# Patient Record
Sex: MALE | Race: WHITE | Employment: STUDENT | URBAN - METROPOLITAN AREA
[De-identification: names, ages, dates, MRNs, and addresses within clinical notes are randomized per-mention and may not be internally consistent; named-entity substitution may affect disease eponyms.]

---
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
## 2022-12-11 ENCOUNTER — HOSPITAL ENCOUNTER (EMERGENCY)
Age: 22
Discharge: HOME | End: 2022-12-11
Attending: EMERGENCY MEDICINE
Payer: MEDICAID

## 2022-12-11 VITALS
TEMPERATURE: 98 F | DIASTOLIC BLOOD PRESSURE: 74 MMHG | RESPIRATION RATE: 18 BRPM | HEART RATE: 71 BPM | WEIGHT: 190 LBS | SYSTOLIC BLOOD PRESSURE: 111 MMHG | OXYGEN SATURATION: 99 %

## 2022-12-11 DIAGNOSIS — S61.411A LACERATION OF RIGHT HAND WITHOUT FOREIGN BODY, INITIAL ENCOUNTER: Primary | ICD-10-CM

## 2022-12-11 PROCEDURE — 99283 EMERGENCY DEPT VISIT LOW MDM: CPT | Performed by: NURSE PRACTITIONER

## 2022-12-11 PROCEDURE — 12001 RPR S/N/AX/GEN/TRNK 2.5CM/<: CPT | Performed by: NURSE PRACTITIONER

## 2022-12-11 PROCEDURE — 99282 EMERGENCY DEPT VISIT SF MDM: CPT | Performed by: NURSE PRACTITIONER

## 2022-12-19 ENCOUNTER — HOSPITAL ENCOUNTER (EMERGENCY)
Age: 22
Discharge: HOME | End: 2022-12-19
Payer: MEDICAID

## 2022-12-19 VITALS
RESPIRATION RATE: 18 BRPM | OXYGEN SATURATION: 98 % | WEIGHT: 190 LBS | HEART RATE: 75 BPM | TEMPERATURE: 98 F | HEIGHT: 71 IN | DIASTOLIC BLOOD PRESSURE: 70 MMHG | BODY MASS INDEX: 26.6 KG/M2 | SYSTOLIC BLOOD PRESSURE: 120 MMHG

## 2022-12-19 DIAGNOSIS — S61.411D LACERATION OF RIGHT HAND, FOREIGN BODY PRESENCE UNSPECIFIED, SUBSEQUENT ENCOUNTER: ICD-10-CM

## 2022-12-19 DIAGNOSIS — Z48.02 ENCOUNTER FOR REMOVAL OF SUTURES: Primary | ICD-10-CM

## (undated) NOTE — LETTER
Date & Time: 10/9/2018, 10:27 PM  Patient: Phyllis Lomas  Encounter Provider(s): Darrick Nicholas MD       To Whom It May Concern:    Phyllis Lomas was seen and treated in our department on 10/9/2018.  He should not participate in gym/sports until 10

## (undated) NOTE — ED AVS SNAPSHOT
Janeen Booker   MRN: [id number]    Department:  [facility] in [place]   Date of Visit:  10/3/2019           Disclosure     Insurance plans vary and the physician(s) referred by the ER may not be covered by your plan.  Please conta tell this physician (or your personal doctor if your instructions are to return to your personal doctor) about any new or lasting problems. The primary care or specialist physician will see patients referred from the [facility] Emergency Department.  Travis Hastings

## (undated) NOTE — LETTER
Date & Time: 10/3/2019, 1:50 PM  Patient: Monica Ybarra  Encounter Provider(s):    Anne Beltre       To Whom It May Concern:    Monica Ybarra was seen and treated in our department on 10/3/2019.  Sunshine Jarrell is cleared to return to all sports a

## (undated) NOTE — ED AVS SNAPSHOT
Adrian Hayes   MRN: [id number]    Department:  THE [facility] Emergency Department in [place]   Date of Visit:  10/9/2018           Disclosure     Insurance plans vary and the physician(s) referred by the ER may not be covered by your plan.  Please conta tell this physician (or your personal doctor if your instructions are to return to your personal doctor) about any new or lasting problems. The primary care or specialist physician will see patients referred from the [facility] Emergency Department.  Shai Pierre

## (undated) NOTE — ED AVS SNAPSHOT
Mardealan Russell   MRN: [id number]    Department:  THE [facility] Emergency Department in [place]   Date of Visit:  9/22/2019           Disclosure     Insurance plans vary and the physician(s) referred by the ER may not be covered by your plan.  Please conta tell this physician (or your personal doctor if your instructions are to return to your personal doctor) about any new or lasting problems. The primary care or specialist physician will see patients referred from the [facility] Emergency Department.  Armani Arellano

## (undated) NOTE — LETTER
Date & Time: 10/3/2019, 1:44 PM  Patient: Channing Orn      To Whom It May Concern:    Channing Orn was seen and treated in our department on 10/3/2019. He may return to gym/sports today.     If you have any questions or concerns, please do not he

## (undated) NOTE — LETTER
September 22, 2019    Patient: Nat Russell   Date of Visit: 9/22/2019       To Whom It May Concern:    Nat Russell was seen and treated in our emergency department on 9/22/2019. He should not participate in gym/sports until 1 week.     If you